# Patient Record
Sex: FEMALE | Race: ASIAN
[De-identification: names, ages, dates, MRNs, and addresses within clinical notes are randomized per-mention and may not be internally consistent; named-entity substitution may affect disease eponyms.]

---

## 2020-02-04 PROBLEM — Z00.00 ENCOUNTER FOR PREVENTIVE HEALTH EXAMINATION: Status: ACTIVE | Noted: 2020-02-04

## 2020-02-08 ENCOUNTER — APPOINTMENT (OUTPATIENT)
Dept: NEUROSURGERY | Facility: CLINIC | Age: 80
End: 2020-02-08
Payer: MEDICARE

## 2020-02-08 VITALS — BODY MASS INDEX: 24.17 KG/M2 | WEIGHT: 128 LBS | HEIGHT: 61 IN

## 2020-02-08 DIAGNOSIS — M48.062 SPINAL STENOSIS, LUMBAR REGION WITH NEUROGENIC CLAUDICATION: ICD-10-CM

## 2020-02-08 PROCEDURE — 99205 OFFICE O/P NEW HI 60 MIN: CPT

## 2020-02-08 NOTE — ASSESSMENT
[FreeTextEntry1] : 80 year old lady with 3 months of walking difficulty due to right LE weakness and pain in the right low back and groin. Also has mild urinary frequency, retention, and urgency but no incontinence. Previous episode about 8 years ago with good recovery with PT although current episode is more severe and lengthy. She can hardly walk due too a combination of pain and right LE weakness. Also has some right foot paresthesia. \par \par On exam, she has antalgic gait, requiring a cane for the last 3 months. Negative SLRs bilaterally. Left LE strength intact throughout. Right Hip flexor and knee extensor are 4-/5. right dorsi and plantar flexion are 5/5. KJs 2+ bilaterally. Mild tenderness to palpation in the lower back, worse on the right. 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 \par \par Discussed with pt at length about treatment options. Indications, benefits, risks and alternatives of surgical decompression and interlaminar stabilization. For now we will start Medrol dose sobeida to temper the weakness. Also refer to Dr. Schuster for possible injection therapy.

## 2020-02-10 RX ORDER — METHYLPREDNISOLONE 4 MG/1
4 TABLET ORAL
Qty: 1 | Refills: 1 | Status: ACTIVE | COMMUNITY
Start: 2020-02-10 | End: 1900-01-01

## 2020-02-10 RX ORDER — OMEPRAZOLE 40 MG/1
40 CAPSULE, DELAYED RELEASE ORAL
Qty: 7 | Refills: 0 | Status: ACTIVE | COMMUNITY
Start: 2020-02-10 | End: 1900-01-01